# Patient Record
Sex: FEMALE | Race: WHITE
[De-identification: names, ages, dates, MRNs, and addresses within clinical notes are randomized per-mention and may not be internally consistent; named-entity substitution may affect disease eponyms.]

---

## 2020-08-19 ENCOUNTER — HOSPITAL ENCOUNTER (OUTPATIENT)
Dept: HOSPITAL 43 - DL.SDS | Age: 80
Discharge: HOME | End: 2020-08-19
Attending: OPHTHALMOLOGY
Payer: MEDICARE

## 2020-08-19 DIAGNOSIS — Z79.890: ICD-10-CM

## 2020-08-19 DIAGNOSIS — F41.9: ICD-10-CM

## 2020-08-19 DIAGNOSIS — Z88.8: ICD-10-CM

## 2020-08-19 DIAGNOSIS — I10: ICD-10-CM

## 2020-08-19 DIAGNOSIS — F17.210: ICD-10-CM

## 2020-08-19 DIAGNOSIS — Z79.899: ICD-10-CM

## 2020-08-19 DIAGNOSIS — Z79.82: ICD-10-CM

## 2020-08-19 DIAGNOSIS — H25.813: Primary | ICD-10-CM

## 2020-08-19 DIAGNOSIS — F32.9: ICD-10-CM

## 2020-08-19 PROCEDURE — 66984 XCAPSL CTRC RMVL W/O ECP: CPT

## 2020-08-19 PROCEDURE — V2632 POST CHMBR INTRAOCULAR LENS: HCPCS

## 2020-08-19 PROCEDURE — 00142 ANES PX ON EYE LENS SURGERY: CPT

## 2020-08-19 NOTE — OR
DATE:  08/19/2020

 

PREOPERATIVE DIAGNOSIS:  Visually significant mixed cataract, left eye.

 

POSTOPERATIVE DIAGNOSIS:  Visually significant mixed cataract, left eye.

 

PROCEDURE:  Extracapsular cataract extraction with intraocular lens implant,

left eye.

 

ANESTHESIA:  Topical/local MAC.

 

COMPLICATIONS:  None.

 

INDICATION:  Ms. Benito was seen in the clinic with complaints of blurred vision.

Examination revealed visually significant mixed cataract.  I explained options,

offered cataract surgery, and I explained risks, including, but not limited to,

infection, retinal detachment, loss of vision, need for additional surgery, and

risks associated with anesthesia.  We discussed implant options.  She has

requested a monofocal implant.  She understands that she will likely require

glasses for some activities following surgery.

 

OPERATIVE DESCRIPTION:  After informed consent was obtained and the risks,

benefits, and alternatives were explained, the patient was brought to the

operative suite and topical anesthesia was administered.  The patient was then

prepped and draped in the sterile fashion and attention was placed on the left

eye.  A sterile lid speculum was placed into the left eye to allow operative

exposure. A full-thickness paracentesis was made in the temporal portion of the

operative eye. Preservative-free lidocaine 0.1 mL was injected into the anterior

chamber followed by viscoelastic. A full-thickness corneal incision was then

made into the anterior chamber.  A bent needle cystotome was used to create a

small nick in the anterior capsule. The capsulorrhexis forceps was then used to

create a 360-degree curvilinear capsulorrhexis.  The nucleus was then removed

using a phacoemulsification handpiece and the remaining cortical material was

then removed with irrigation and aspiration handpiece. Following removal of the

cortical material, the capsular bag was then inspected and noted to be free of

any holes or tears. Viscoelastic was then injected into the capsular bag and the

intraocular lens was inserted into the capsular bag. The viscoelastic material

was then removed from both the anterior and posterior chambers and from behind

the IOL. The lens and capsular bag were then reinspected. The IOL was well

centered and the capsular bag intact. The wound and paracentesis sites were

inspected and hydrated with balanced saline solution. Both were found to be self-

sealing. The intraocular pressure was assessed digitally and found to be within

normal range. A good red reflex was noted at the completion of the procedure. No

complications occurred during the operation. At the completion of the procedure,

Maxitrol, Voltaren, and Iopidine drops were placed into the operative eye. A

sterile eye shield was placed over the operative eye and the patient was

transported to the postoperative recovery area having tolerated the procedure

well. Postoperative instructions were given along with a postoperative

appointment.  The patient was advised to call with any questions or concerns.

 

DD:  08/19/2020 09:49:50

DT:  08/19/2020 11:14:49

[facility]

Job #:  [id number]

## 2020-08-26 ENCOUNTER — HOSPITAL ENCOUNTER (OUTPATIENT)
Dept: HOSPITAL 43 - DL.SDS | Age: 80
Discharge: HOME | End: 2020-08-26
Attending: OPHTHALMOLOGY
Payer: MEDICARE

## 2020-08-26 DIAGNOSIS — H25.811: Primary | ICD-10-CM

## 2020-08-26 DIAGNOSIS — I10: ICD-10-CM

## 2020-08-26 DIAGNOSIS — Z88.8: ICD-10-CM

## 2020-08-26 DIAGNOSIS — Z79.899: ICD-10-CM

## 2020-08-26 DIAGNOSIS — F41.9: ICD-10-CM

## 2020-08-26 DIAGNOSIS — Z79.890: ICD-10-CM

## 2020-08-26 DIAGNOSIS — F32.9: ICD-10-CM

## 2020-08-26 DIAGNOSIS — Z79.82: ICD-10-CM

## 2020-08-26 DIAGNOSIS — F17.210: ICD-10-CM

## 2020-08-26 PROCEDURE — V2632 POST CHMBR INTRAOCULAR LENS: HCPCS

## 2020-08-27 NOTE — OR
DATE:  08/26/2020

 

PREOPERATIVE DIAGNOSIS:  Visually significant mixed cataract, right eye.

 

POSTOPERATIVE DIAGNOSIS:  Visually significant mixed cataract, right eye.

 

PROCEDURE:  Extracapsular cataract extraction with intraocular lens implant,

right eye.

 

ANESTHESIA:  Topical/local MAC.

 

COMPLICATIONS:  None.

 

INDICATION:  Ms. Benito was seen in the clinic.  She has complained of a slow

progressive decrease in vision.  Examination revealed visually significant

cataract.  I explained options, offered cataract surgery, and I explained risks,

including, but not limited to, infection, retinal detachment, loss of vision,

need for additional surgery, and risks associated with anesthesia, amongst

others.  We discussed implant options.  She has requested a monofocal implant.

She is comfortable wearing glasses following surgery if necessary.

 

OPERATIVE DESCRIPTION:  After informed consent was obtained and the risks,

benefits, and alternatives were explained, the patient was brought to the

operative suite and topical anesthesia was administered.  The patient was then

prepped and draped in the sterile fashion and attention was placed on the right

eye.  A sterile lid speculum was placed into the right eye to allow operative

exposure. A full-thickness paracentesis was made in the temporal portion of the

operative eye. Preservative-free lidocaine 0.1 mL was injected into the anterior

chamber followed by viscoelastic. A full-thickness corneal incision was then

made into the anterior chamber.  A bent needle cystotome was used to create a

small nick in the anterior capsule. The capsulorrhexis forceps was then used to

create a 360-degree curvilinear capsulorrhexis.  The nucleus was then removed

using a phacoemulsification handpiece and the remaining cortical material was

then removed with irrigation and aspiration handpiece. Following removal of the

cortical material, the capsular bag was then inspected and noted to be free of

any holes or tears. Viscoelastic was then injected into the capsular bag and the

intraocular lens was inserted into the capsular bag. The viscoelastic material

was then removed from both the anterior and posterior chambers and from behind

the IOL. The lens and capsular bag were then reinspected. The IOL was well

centered and the capsular bag intact. The wound and paracentesis sites were

inspected and hydrated with balanced saline solution. Both were found to be self-

sealing. The intraocular pressure was assessed digitally and found to be within

normal range. A good red reflex was noted at the completion of the procedure. No

complications occurred during the operation. At the completion of the procedure,

Maxitrol, Voltaren, and Iopidine drops were placed into the operative eye. A

sterile eye shield was placed over the operative eye and the patient was

transported to the postoperative recovery area having tolerated the procedure

well. Postoperative instructions were given along with a postoperative

appointment.  The patient was advised to call with any questions or concerns.

 

DD:  08/26/2020 08:32:54

DT:  08/26/2020 15:06:22

[facility]

Job #:  [id number]

## 2022-02-17 ENCOUNTER — HOSPITAL ENCOUNTER (EMERGENCY)
Dept: HOSPITAL 43 - DL.ED | Age: 82
Discharge: HOME | End: 2022-02-17
Payer: MEDICARE

## 2022-02-17 DIAGNOSIS — Z79.899: ICD-10-CM

## 2022-02-17 DIAGNOSIS — Z79.82: ICD-10-CM

## 2022-02-17 DIAGNOSIS — I10: ICD-10-CM

## 2022-02-17 DIAGNOSIS — G20: Primary | ICD-10-CM

## 2022-02-17 DIAGNOSIS — T42.8X5A: ICD-10-CM

## 2022-02-17 DIAGNOSIS — Z20.822: ICD-10-CM

## 2022-02-17 DIAGNOSIS — Z88.8: ICD-10-CM

## 2022-02-17 DIAGNOSIS — E03.9: ICD-10-CM

## 2022-02-17 LAB
ANION GAP SERPL CALC-SCNC: 10.9 MEQ/L (ref 7–13)
CHLORIDE SERPL-SCNC: 100 MMOL/L (ref 98–107)
SODIUM SERPL-SCNC: 137 MMOL/L (ref 136–145)

## 2022-02-17 PROCEDURE — 83605 ASSAY OF LACTIC ACID: CPT

## 2022-02-17 PROCEDURE — 85610 PROTHROMBIN TIME: CPT

## 2022-02-17 PROCEDURE — U0002 COVID-19 LAB TEST NON-CDC: HCPCS

## 2022-02-17 PROCEDURE — 85025 COMPLETE CBC W/AUTO DIFF WBC: CPT

## 2022-02-17 PROCEDURE — 87040 BLOOD CULTURE FOR BACTERIA: CPT

## 2022-02-17 PROCEDURE — 84484 ASSAY OF TROPONIN QUANT: CPT

## 2022-02-17 PROCEDURE — 93005 ELECTROCARDIOGRAM TRACING: CPT

## 2022-02-17 PROCEDURE — 99284 EMERGENCY DEPT VISIT MOD MDM: CPT

## 2022-02-17 PROCEDURE — 81001 URINALYSIS AUTO W/SCOPE: CPT

## 2022-02-17 PROCEDURE — 70450 CT HEAD/BRAIN W/O DYE: CPT

## 2022-02-17 PROCEDURE — 83880 ASSAY OF NATRIURETIC PEPTIDE: CPT

## 2022-02-17 PROCEDURE — 36415 COLL VENOUS BLD VENIPUNCTURE: CPT

## 2022-02-17 PROCEDURE — 71045 X-RAY EXAM CHEST 1 VIEW: CPT

## 2022-02-17 PROCEDURE — 80053 COMPREHEN METABOLIC PANEL: CPT

## 2022-02-25 ENCOUNTER — HOSPITAL ENCOUNTER (EMERGENCY)
Dept: HOSPITAL 43 - DL.ED | Age: 82
Discharge: HOME | End: 2022-02-25
Payer: MEDICARE

## 2022-02-25 DIAGNOSIS — Z79.899: ICD-10-CM

## 2022-02-25 DIAGNOSIS — N30.01: ICD-10-CM

## 2022-02-25 DIAGNOSIS — Z20.822: ICD-10-CM

## 2022-02-25 DIAGNOSIS — G20: Primary | ICD-10-CM

## 2022-02-25 DIAGNOSIS — E03.9: ICD-10-CM

## 2022-02-25 DIAGNOSIS — Z88.8: ICD-10-CM

## 2022-02-25 DIAGNOSIS — T44.995A: ICD-10-CM

## 2022-02-25 DIAGNOSIS — Z79.82: ICD-10-CM

## 2022-02-25 DIAGNOSIS — I10: ICD-10-CM

## 2022-02-25 LAB
AMPHET UR QL SCN: NEGATIVE
AMPHET UR QL SCN: NEGATIVE
AMPHETAMINES UR QL SCN>500 NG/ML: NEGATIVE
ANION GAP SERPL CALC-SCNC: 12 MEQ/L (ref 7–13)
BARBITURATES UR QL SCN: NEGATIVE
CHLORIDE SERPL-SCNC: 101 MMOL/L (ref 98–107)
MDMA UR QL SCN: NEGATIVE
OXYCODONE UR QL SCN: NEGATIVE
PCP UR QL SCN: NEGATIVE
SARS-COV-2 RNA RESP QL NAA+PROBE: NEGATIVE
SODIUM SERPL-SCNC: 138 MMOL/L (ref 136–145)
TRICYCLICS UR QL SCN: NEGATIVE

## 2022-02-25 PROCEDURE — 80053 COMPREHEN METABOLIC PANEL: CPT

## 2022-02-25 PROCEDURE — 80307 DRUG TEST PRSMV CHEM ANLYZR: CPT

## 2022-02-25 PROCEDURE — 86140 C-REACTIVE PROTEIN: CPT

## 2022-02-25 PROCEDURE — 81001 URINALYSIS AUTO W/SCOPE: CPT

## 2022-02-25 PROCEDURE — 83605 ASSAY OF LACTIC ACID: CPT

## 2022-02-25 PROCEDURE — 93010 ELECTROCARDIOGRAM REPORT: CPT

## 2022-02-25 PROCEDURE — 80305 DRUG TEST PRSMV DIR OPT OBS: CPT

## 2022-02-25 PROCEDURE — 0240U: CPT

## 2022-02-25 PROCEDURE — 85025 COMPLETE CBC W/AUTO DIFF WBC: CPT

## 2022-02-25 PROCEDURE — 99284 EMERGENCY DEPT VISIT MOD MDM: CPT

## 2022-02-25 PROCEDURE — 93005 ELECTROCARDIOGRAM TRACING: CPT

## 2022-02-25 PROCEDURE — 83735 ASSAY OF MAGNESIUM: CPT

## 2022-02-25 PROCEDURE — 84484 ASSAY OF TROPONIN QUANT: CPT

## 2022-02-25 PROCEDURE — 36415 COLL VENOUS BLD VENIPUNCTURE: CPT

## 2022-03-14 ENCOUNTER — HOSPITAL ENCOUNTER (EMERGENCY)
Dept: HOSPITAL 43 - DL.ED | Age: 82
Discharge: HOME | End: 2022-03-14
Payer: MEDICARE

## 2022-03-14 DIAGNOSIS — Z88.8: ICD-10-CM

## 2022-03-14 DIAGNOSIS — E03.9: ICD-10-CM

## 2022-03-14 DIAGNOSIS — I10: ICD-10-CM

## 2022-03-14 DIAGNOSIS — Z79.82: ICD-10-CM

## 2022-03-14 DIAGNOSIS — Z79.899: ICD-10-CM

## 2022-03-14 DIAGNOSIS — K11.20: Primary | ICD-10-CM

## 2022-03-14 LAB
ANION GAP SERPL CALC-SCNC: 11.6 MEQ/L (ref 7–13)
CHLORIDE SERPL-SCNC: 104 MMOL/L (ref 98–107)
EGFRCR SERPLBLD CKD-EPI 2021: > 60 ML/MIN/{1.73_M2}
SODIUM SERPL-SCNC: 137 MMOL/L (ref 136–145)

## 2022-03-14 PROCEDURE — 96375 TX/PRO/DX INJ NEW DRUG ADDON: CPT

## 2022-03-14 PROCEDURE — 99284 EMERGENCY DEPT VISIT MOD MDM: CPT

## 2022-03-14 PROCEDURE — 96365 THER/PROPH/DIAG IV INF INIT: CPT

## 2022-03-14 PROCEDURE — 83605 ASSAY OF LACTIC ACID: CPT

## 2022-03-14 PROCEDURE — 70491 CT SOFT TISSUE NECK W/DYE: CPT

## 2022-03-14 PROCEDURE — 85025 COMPLETE CBC W/AUTO DIFF WBC: CPT

## 2022-03-14 PROCEDURE — 87040 BLOOD CULTURE FOR BACTERIA: CPT

## 2022-03-14 PROCEDURE — 80053 COMPREHEN METABOLIC PANEL: CPT

## 2022-03-14 PROCEDURE — 99283 EMERGENCY DEPT VISIT LOW MDM: CPT

## 2022-03-14 PROCEDURE — 36415 COLL VENOUS BLD VENIPUNCTURE: CPT
